# Patient Record
Sex: MALE | Race: BLACK OR AFRICAN AMERICAN | Employment: FULL TIME | URBAN - METROPOLITAN AREA
[De-identification: names, ages, dates, MRNs, and addresses within clinical notes are randomized per-mention and may not be internally consistent; named-entity substitution may affect disease eponyms.]

---

## 2024-01-01 ENCOUNTER — HOSPITAL ENCOUNTER (EMERGENCY)
Age: 44
End: 2024-07-14
Attending: EMERGENCY MEDICINE

## 2024-01-01 VITALS
DIASTOLIC BLOOD PRESSURE: 28 MMHG | SYSTOLIC BLOOD PRESSURE: 155 MMHG | HEIGHT: 74 IN | BODY MASS INDEX: 34.01 KG/M2 | WEIGHT: 265 LBS

## 2024-01-01 DIAGNOSIS — Z86.711 HISTORY OF PULMONARY EMBOLISM: ICD-10-CM

## 2024-01-01 DIAGNOSIS — I46.9 CARDIAC ARREST (HCC): Primary | ICD-10-CM

## 2024-01-01 LAB
GLUCOSE BLD-MCNC: 189 MG/DL (ref 70–99)
PERFORMED ON: ABNORMAL

## 2024-01-01 PROCEDURE — 6360000002 HC RX W HCPCS: Performed by: PHYSICIAN ASSISTANT

## 2024-01-01 PROCEDURE — 99285 EMERGENCY DEPT VISIT HI MDM: CPT

## 2024-01-01 PROCEDURE — 31500 INSERT EMERGENCY AIRWAY: CPT

## 2024-01-01 PROCEDURE — 2500000003 HC RX 250 WO HCPCS: Performed by: PHYSICIAN ASSISTANT

## 2024-01-01 PROCEDURE — 2580000003 HC RX 258: Performed by: PHYSICIAN ASSISTANT

## 2024-01-01 PROCEDURE — 92950 HEART/LUNG RESUSCITATION CPR: CPT

## 2024-01-01 PROCEDURE — 2500000003 HC RX 250 WO HCPCS: Performed by: EMERGENCY MEDICINE

## 2024-01-01 RX ORDER — 0.9 % SODIUM CHLORIDE 0.9 %
INTRAVENOUS SOLUTION INTRAVENOUS CONTINUOUS PRN
Status: COMPLETED | OUTPATIENT
Start: 2024-01-01 | End: 2024-01-01

## 2024-01-01 RX ORDER — CALCIUM CHLORIDE 100 MG/ML
INJECTION INTRAVENOUS; INTRAVENTRICULAR DAILY PRN
Status: COMPLETED | OUTPATIENT
Start: 2024-01-01 | End: 2024-01-01

## 2024-01-01 RX ORDER — EPINEPHRINE IN SOD CHLOR,ISO 1 MG/10 ML
SYRINGE (ML) INTRAVENOUS DAILY PRN
Status: COMPLETED | OUTPATIENT
Start: 2024-01-01 | End: 2024-01-01

## 2024-01-01 RX ADMIN — EPINEPHRINE 1 MG: 0.1 INJECTION, SOLUTION ENDOTRACHEAL; INTRACARDIAC; INTRAVENOUS at 08:38

## 2024-01-01 RX ADMIN — SODIUM BICARBONATE 50 MEQ: 84 INJECTION INTRAVENOUS at 09:12

## 2024-01-01 RX ADMIN — EPINEPHRINE 1 MG: 0.1 INJECTION, SOLUTION ENDOTRACHEAL; INTRACARDIAC; INTRAVENOUS at 08:50

## 2024-01-01 RX ADMIN — SODIUM CHLORIDE 1000 ML: 9 INJECTION, SOLUTION INTRAVENOUS at 08:35

## 2024-01-01 RX ADMIN — EPINEPHRINE 1 MG: 0.1 INJECTION, SOLUTION ENDOTRACHEAL; INTRACARDIAC; INTRAVENOUS at 08:41

## 2024-01-01 RX ADMIN — EPINEPHRINE 1 MG: 0.1 INJECTION, SOLUTION ENDOTRACHEAL; INTRACARDIAC; INTRAVENOUS at 09:02

## 2024-01-01 RX ADMIN — EPINEPHRINE 1 MG: 0.1 INJECTION, SOLUTION ENDOTRACHEAL; INTRACARDIAC; INTRAVENOUS at 08:53

## 2024-01-01 RX ADMIN — EPINEPHRINE 1 MG: 0.1 INJECTION, SOLUTION ENDOTRACHEAL; INTRACARDIAC; INTRAVENOUS at 08:56

## 2024-01-01 RX ADMIN — CALCIUM CHLORIDE 1000 MG: 100 INJECTION INTRAVENOUS; INTRAVENTRICULAR at 08:52

## 2024-01-01 RX ADMIN — EPINEPHRINE 1 MG: 0.1 INJECTION, SOLUTION ENDOTRACHEAL; INTRACARDIAC; INTRAVENOUS at 09:11

## 2024-01-01 RX ADMIN — SODIUM BICARBONATE 50 MEQ: 84 INJECTION INTRAVENOUS at 08:59

## 2024-01-01 RX ADMIN — SODIUM BICARBONATE 50 MEQ: 84 INJECTION INTRAVENOUS at 08:47

## 2024-01-01 RX ADMIN — EPINEPHRINE 1 MG: 0.1 INJECTION, SOLUTION ENDOTRACHEAL; INTRACARDIAC; INTRAVENOUS at 09:07

## 2024-01-01 RX ADMIN — EPINEPHRINE 1 MG: 0.1 INJECTION, SOLUTION ENDOTRACHEAL; INTRACARDIAC; INTRAVENOUS at 08:47

## 2024-01-01 RX ADMIN — EPINEPHRINE 1 MG: 0.1 INJECTION, SOLUTION ENDOTRACHEAL; INTRACARDIAC; INTRAVENOUS at 08:59

## 2024-01-01 RX ADMIN — EPINEPHRINE 1 MG: 0.1 INJECTION, SOLUTION ENDOTRACHEAL; INTRACARDIAC; INTRAVENOUS at 09:17

## 2024-07-14 NOTE — ED PROVIDER NOTES
[facility] EMERGENCY DEPARTMENT  eMERGENCY dEPARTMENT eNCOUnter    *Procedure note only*    Pt Name: Clayton Romo  MRN: [id number]  Birthdate 1980  Date of evaluation: 7/14/2024  Provider: Bhavik Ugarte PA-C    I was asked by the attending physician, Quintin Veras DO, to intubate patient       RADIOLOGY:   Non-plain film images such as CT, Ultrasound and MRI are read by the radiologist. Plain radiographic images are visualized and preliminarily interpreted by the  ED Provider with the below findings:      Interpretation per the Radiologist below, if available at the time of this note:    No orders to display     No results found.    PROCEDURES   Unless otherwise noted below, none     Intubation    Date/Time: 7/14/2024 9:26 AM    Performed by: Bhavik Ugarte PA-C  Authorized by: Quintin Veras DO    Consent:     Consent obtained:  Emergent situation  Pre-procedure details:     Indications: respiratory failure      Patient status:  Unresponsive    Look externally: no concerns      Neck mobility: normal      Pharmacologic strategy: none      Induction agents:  None    Paralytics:  None  Procedure details:     Preoxygenation:  Bag valve mask    CPR in progress: yes      Number of attempts:  2  Successful intubation attempt details:     Intubation method:  Oral    Intubation technique: video assisted      Laryngoscope blade:  Mac 3    Bougie used: no      Tube size (mm):  7.5    Tube type:  Cuffed    Tube visualized through cords: yes    First unsuccessful intubation attempt details:     Intubation method:  Oral    Intubation technique:  Video assisted    Laryngoscope blade:  Mac 3    Bougie used: no      Tube size (mm):  8.0    Tube type:  Cuffed    Ventilation between 1st and 2nd attempt: no      Tube visualized through cords: yes    Placement assessment:     ETT at teeth/gumline (cm):  24    Tube secured with:  ETT back    Breath sounds:  Equal    Placement verification: chest rise,

## 2024-07-14 NOTE — ED NOTES
Family left, post mortem care completed.   speaking to  home.  Pt is not released, pending [facility]

## 2024-07-14 NOTE — CODE DOCUMENTATION
Pt brought in by [place] [facility], witness arrest.  Pt had duoneb and solumedrol in route.  Pt lost is pulse while entering the ed.  Compressions started.  See code narrator.  Et tube exchanged at 0846 due to spo2 remained low, pt easy to bag.  Et tube exchanged, spo2 remained low.  Positive color change, glidescope used, et tube in trachea.

## 2024-07-14 NOTE — PROGRESS NOTES
RN referral for family support-patient came in as a code and subsequently .  Support provided to mother, stepfather, father and multiple other family and friends.   Family shared stories about patient's life.  Maggie important to family.  Per mother, Moses's, request, notified [facility] for arrangements.  ([phone]; spoke with Alyssa at [phone]). Alyssa aware patient has  yet to be released. Support and prayer provided to family throughout stay in ED.  Notified family's  who will follow up with family.

## 2024-07-14 NOTE — ED PROVIDER NOTES
EMERGENCY DEPARTMENT PROVIDER NOTE    Patient Identification  Pt Name: Clayton Romo  MRN: [id number]  Birthdate 1980  Date of evaluation: 7/14/2024  Provider: Quintin Veras DO  PCP: No primary care provider on file.    Chief Complaint  Cardiac Arrest (Pt brought in by [facility] from home, lost pulse coming into the ed.  Hx of pe.  Pt in pea, compressions in progress  unsure if pt is on blood thinners)      HPI  (History provided by EMS, family)  This is a 43 y.o. male with pertinent past medical history of pulmonary emboli who was brought in by EMS transportation for shortness of breath.  Patient arrives pulseless with CPR in progress.  Per EMS were called to patient's home due to shortness of breath, state patient initially tried to take a shower to help his symptoms however his breathing worsened and thus he called 911.  On arrival the patient's oxygen saturations were in the 80s on room air, EMS provided a breathing treatment while in the ambulance during which time patient became unresponsive and lost his pulse at time of arrival to the ED.  Initial rhythm is PEA on arrival to the emergency department.    I was later able to obtain further history from patient's mother who states that she does not believe the patient has been taking his anticoagulation medication.  Family states the patient has been feeling ill with shortness of breath and chills for the past week, believed he was dehydrated.      I have reviewed the following nursing documentation:  Allergies: Pcn [penicillins]    Past medical history: No past medical history on file.  Past surgical history:   Past Surgical History:   Procedure Laterality Date    BACK SURGERY         Home medications:   Previous Medications    NAPROXEN (NAPROSYN) 500 MG TABLET    Take 1 tablet by mouth 2 times daily for 20 doses       Social history:  reports that he has never smoked. He does not have any smokeless tobacco history on file. He reports that he does

## 2024-07-14 NOTE — ED NOTES
Family and friends at bedside, et tube removed, monitor wires removed.  Chaplain Batres at bedside.